# Patient Record
Sex: MALE | Race: WHITE | NOT HISPANIC OR LATINO | Employment: UNEMPLOYED | URBAN - METROPOLITAN AREA
[De-identification: names, ages, dates, MRNs, and addresses within clinical notes are randomized per-mention and may not be internally consistent; named-entity substitution may affect disease eponyms.]

---

## 2017-02-20 PROBLEM — F22 PARANOIA (PSYCHOSIS): Status: ACTIVE | Noted: 2017-02-20

## 2017-05-23 PROBLEM — F29 PSYCHOSIS: Status: ACTIVE | Noted: 2017-05-23

## 2017-05-31 ENCOUNTER — PATIENT OUTREACH (OUTPATIENT)
Dept: ADMINISTRATIVE | Facility: CLINIC | Age: 37
End: 2017-05-31

## 2017-05-31 NOTE — PATIENT INSTRUCTIONS
Psychosis  Psychosis is a symptom of certain mental health problems. It involves perceiving reality differently from those around you. The difference between reality and what you think become blurred in your mind. Other mental health conditions, physical diseases, traumatic experiences, or drugs and toxins may bring on psychotic symptoms or behavior.  There are different kinds of psychosis:  · Drug-induced (due to alcohol, methamphetamine, cocaine, LSD, PCP and others)  · Bipolar disorder  · Depression  · Schizophrenia  · Dementia  Symptoms  The symptoms of psychosis may not all be the same for each person. However, they usually involve:  · Hallucinations. Seeing, hearing, feeling, or even tasting or smelling things that are not there  · Delusions. Believing something that is not true, or false beliefs that are not part of a person's Judaism or cultural background.  There may also be disturbances in thinking, speech and behavior, which can include:  · Hearing voices that others do not hear  · Seeing things that others do not see  · Racing thoughts  · Lack of energy  · Feeling very fearful  · Disorganized speech  · Intentional or unintentional bodily harm to others  · Paranoia  · Trouble thinking or concentrating clearly  · Depression, feeling suicidal  · Insomnia  · Withdrawal from those around you  Treatment for psychosis depends on the cause. Medicine, with or without psychotherapy, is often used.  Home care  · Find a healthcare provider and therapist who meet your needs.  Seek help when you feel like your symptoms are returning  · Be certain to tell each of your healthcare providers about all of the prescription drugs, over-the-counter medicines, and supplements you take. Certain supplements interact with medicines and can result in dangerous side effects. Ask your pharmacist when you have questions about drug interactions.  · Be sure to take your medicine as directed even if you think you don't need  it.  · Follow-up with lab tests as advised by your healthcare provider.  · Talk with your family about your feelings and thoughts. Ask them to help you recognize any behavior changes so you can get help and, if needed, medicines can be adjusted.  Follow-up care  Follow up with your counselor, therapist or psychiatrist as advised.  Call 911  Call 911 if you:  · Have suicidal thoughts, a suicide plan, and the means to carry out the plan  · Have troubled breathing  · Are very confused  · Are very drowsy or have trouble awakening  · Faint or lose consciousness  · Have a rapid heart rate, very low heart rate, or a new irregular heart rate  · Have a seizure  When to seek medical advice  Call your healthcare provider right away if any of these occur:  · Gradual or rapid return of psychotic symptoms  · Feeling like you want to harm yourself or another  · Feeling extremely depressed  · Feeling very anxious, agitated, or angry  · Feeling out of control or being controlled by others  · Unable to care for yourself  · Seeing things or hearing voices that you know aren't real  Date Last Reviewed: 9/29/2015  © 8067-7968 [employer]. [address] [place] [address]. All rights reserved. This information is not intended as a substitute for professional medical care. Always follow your healthcare professional's instructions.

## 2017-05-31 NOTE — PROGRESS NOTES
TCC script completed by mother Thais Owen. She reports patient is working daily, no drugs, taking medications.

## 2017-07-27 PROBLEM — F15.10 AMPHETAMINE ABUSE: Status: ACTIVE | Noted: 2017-07-27

## 2017-07-31 PROBLEM — F15.10 AMPHETAMINE ABUSE: Status: ACTIVE | Noted: 2017-07-31

## 2018-11-01 PROBLEM — F23 BRIEF PSYCHOTIC DISORDER: Status: ACTIVE | Noted: 2018-11-01

## 2018-11-01 PROBLEM — R45.851 SUICIDAL IDEATION: Status: ACTIVE | Noted: 2018-11-01

## 2018-11-06 PROBLEM — F15.10 AMPHETAMINE ABUSE: Status: RESOLVED | Noted: 2017-07-31 | Resolved: 2018-11-06

## 2018-11-06 PROBLEM — R45.851 SUICIDAL IDEATION: Status: RESOLVED | Noted: 2018-11-01 | Resolved: 2018-11-06

## 2018-11-08 ENCOUNTER — NURSE TRIAGE (OUTPATIENT)
Dept: ADMINISTRATIVE | Facility: CLINIC | Age: 38
End: 2018-11-08

## 2018-11-08 ENCOUNTER — PATIENT OUTREACH (OUTPATIENT)
Dept: ADMINISTRATIVE | Facility: CLINIC | Age: 38
End: 2018-11-08

## 2018-11-08 NOTE — TELEPHONE ENCOUNTER
Reason for Disposition   [1] Prescription not at pharmacy AND [2] was prescribed today by PCP    Protocols used: ST MEDICATION QUESTION CALL-A-    Mother of Pt calling back due to Pt losing prescription for Depakote.  Pt's pharmacy is  [facility]  [address] [place] [address]; phoned in Rx @ [phone].  Mother called and notified.

## 2018-11-08 NOTE — PATIENT INSTRUCTIONS
Psychosis  Psychosis is a symptom of certain mental health problems. It involves perceiving reality differently from those around you. The difference between reality and what you think become blurred in your mind. Other mental health conditions, physical diseases, traumatic experiences, or drugs and toxins may bring on psychotic symptoms or behavior.  There are different kinds of psychosis:  · Drug-induced (due to alcohol, methamphetamine, cocaine, LSD, PCP and others)  · Bipolar disorder  · Depression  · Schizophrenia  · Dementia  Symptoms  The symptoms of psychosis may not all be the same for each person. However, they usually involve:  · Hallucinations. Seeing, hearing, feeling, or even tasting or smelling things that are not there  · Delusions. Believing something that is not true, or false beliefs that are not part of a person's Temple or cultural background.  There may also be disturbances in thinking, speech and behavior, which can include:  · Hearing voices that others do not hear  · Seeing things that others do not see  · Racing thoughts  · Lack of energy  · Feeling very fearful  · Disorganized speech  · Intentional or unintentional bodily harm to others  · Paranoia  · Trouble thinking or concentrating clearly  · Depression, feeling suicidal  · Insomnia  · Withdrawal from those around you  Treatment for psychosis depends on the cause. Medicine, with or without psychotherapy, is often used.  Home care  · Find a healthcare provider and therapist who meet your needs.  Seek help when you feel like your symptoms are returning  · Be certain to tell each of your healthcare providers about all of the prescription drugs, over-the-counter medicines, and supplements you take. Certain supplements interact with medicines and can result in dangerous side effects. Ask your pharmacist when you have questions about drug interactions.  · Be sure to take your medicine as directed even if you think you don't need  it.  · Follow-up with lab tests as advised by your healthcare provider.  · Talk with your family about your feelings and thoughts. Ask them to help you recognize any behavior changes so you can get help and, if needed, medicines can be adjusted.  Follow-up care  Follow up with your counselor, therapist or psychiatrist as advised.  Call 911  Call 911 if you:  · Have suicidal thoughts, a suicide plan, and the means to carry out the plan  · Have troubled breathing  · Are very confused  · Are very drowsy or have trouble awakening  · Faint or lose consciousness  · Have a rapid heart rate, very low heart rate, or a new irregular heart rate  · Have a seizure  When to seek medical advice  Call your healthcare provider right away if any of these occur:  · Gradual or rapid return of psychotic symptoms  · Feeling like you want to harm yourself or another  · Feeling extremely depressed  · Feeling very anxious, agitated, or angry  · Feeling out of control or being controlled by others  · Unable to care for yourself  · Seeing things or hearing voices that you know aren't real  Date Last Reviewed: 9/29/2015  © 2255-9914 [employer]. [address] [place] [address]. All rights reserved. This information is not intended as a substitute for professional medical care. Always follow your healthcare professional's instructions.

## 2018-11-08 NOTE — TELEPHONE ENCOUNTER
Reason for Disposition   Caller has medication question only, adult not sick, and triager answers question    Protocols used: ST MEDICATION QUESTION CALL-A-    Pt calling regarding refill for Depakote.  Noted in medication section of chart; Depakote refilled on 11/6/2018 with 11 refills and was printed.  Explained to Pt, Pt verbalized understanding.

## 2018-11-11 PROBLEM — F25.9 SCHIZOAFFECTIVE DISORDER: Status: ACTIVE | Noted: 2018-11-11

## 2018-11-12 PROBLEM — D64.9 ANEMIA: Status: ACTIVE | Noted: 2018-11-12

## 2018-11-12 PROBLEM — F17.200 TOBACCO USE DISORDER: Status: ACTIVE | Noted: 2018-11-12

## 2019-01-25 PROBLEM — R45.851 SUICIDAL IDEATION: Status: ACTIVE | Noted: 2019-01-25

## 2019-01-30 PROBLEM — R45.851 SUICIDAL INTENT: Status: ACTIVE | Noted: 2019-01-30

## 2019-02-05 PROBLEM — R45.851 SUICIDAL INTENT: Status: RESOLVED | Noted: 2019-01-30 | Resolved: 2019-02-05

## 2019-12-22 PROBLEM — F23 ACUTE PSYCHOSIS: Status: ACTIVE | Noted: 2017-05-23

## 2019-12-22 PROBLEM — F22 PARANOID DELUSION: Status: ACTIVE | Noted: 2019-12-22

## 2019-12-23 PROBLEM — F33.2 SEVERE RECURRENT MAJOR DEPRESSION WITHOUT PSYCHOTIC FEATURES: Status: ACTIVE | Noted: 2019-12-23

## 2020-01-01 ENCOUNTER — PATIENT OUTREACH (OUTPATIENT)
Dept: ADMINISTRATIVE | Facility: CLINIC | Age: 40
End: 2020-01-01

## 2021-08-03 ENCOUNTER — HOSPITAL ENCOUNTER (INPATIENT)
Facility: HOSPITAL | Age: 41
LOS: 6 days | Discharge: HOME | DRG: 897 | End: 2021-08-09
Attending: PSYCHIATRY & NEUROLOGY | Admitting: PSYCHIATRY & NEUROLOGY
Payer: MEDICAID

## 2021-08-03 DIAGNOSIS — F29 PSYCHOSIS, UNSPECIFIED PSYCHOSIS TYPE: ICD-10-CM

## 2021-08-03 DIAGNOSIS — F23 ACUTE PSYCHOSIS: Primary | ICD-10-CM

## 2021-08-03 PROCEDURE — 11400000 HC PSYCH PRIVATE ROOM

## 2021-08-03 RX ORDER — HALOPERIDOL 5 MG/ML
5 INJECTION INTRAMUSCULAR EVERY 4 HOURS PRN
Status: DISCONTINUED | OUTPATIENT
Start: 2021-08-03 | End: 2021-08-09 | Stop reason: HOSPADM

## 2021-08-03 RX ORDER — LORAZEPAM 2 MG/ML
2 INJECTION INTRAMUSCULAR EVERY 4 HOURS PRN
Status: DISCONTINUED | OUTPATIENT
Start: 2021-08-03 | End: 2021-08-09 | Stop reason: HOSPADM

## 2021-08-03 RX ORDER — MUPIROCIN 20 MG/G
OINTMENT TOPICAL 2 TIMES DAILY
Status: DISCONTINUED | OUTPATIENT
Start: 2021-08-03 | End: 2021-08-07

## 2021-08-03 RX ORDER — MAG HYDROX/ALUMINUM HYD/SIMETH 200-200-20
30 SUSPENSION, ORAL (FINAL DOSE FORM) ORAL EVERY 6 HOURS PRN
Status: DISCONTINUED | OUTPATIENT
Start: 2021-08-03 | End: 2021-08-09 | Stop reason: HOSPADM

## 2021-08-03 RX ORDER — HYDROXYZINE PAMOATE 50 MG/1
50 CAPSULE ORAL NIGHTLY PRN
Status: DISCONTINUED | OUTPATIENT
Start: 2021-08-03 | End: 2021-08-09 | Stop reason: HOSPADM

## 2021-08-03 RX ORDER — DIPHENHYDRAMINE HYDROCHLORIDE 50 MG/ML
50 INJECTION INTRAMUSCULAR; INTRAVENOUS EVERY 4 HOURS PRN
Status: DISCONTINUED | OUTPATIENT
Start: 2021-08-03 | End: 2021-08-09 | Stop reason: HOSPADM

## 2021-08-03 RX ORDER — ACETAMINOPHEN 325 MG/1
650 TABLET ORAL EVERY 6 HOURS PRN
Status: DISCONTINUED | OUTPATIENT
Start: 2021-08-03 | End: 2021-08-09 | Stop reason: HOSPADM

## 2021-08-03 RX ORDER — HALOPERIDOL 5 MG/1
5 TABLET ORAL EVERY 4 HOURS PRN
Status: DISCONTINUED | OUTPATIENT
Start: 2021-08-03 | End: 2021-08-09 | Stop reason: HOSPADM

## 2021-08-03 RX ORDER — IBUPROFEN 400 MG/1
400 TABLET ORAL EVERY 6 HOURS PRN
Status: DISCONTINUED | OUTPATIENT
Start: 2021-08-03 | End: 2021-08-09 | Stop reason: HOSPADM

## 2021-08-03 RX ORDER — ADHESIVE BANDAGE
30 BANDAGE TOPICAL DAILY PRN
Status: DISCONTINUED | OUTPATIENT
Start: 2021-08-03 | End: 2021-08-09 | Stop reason: HOSPADM

## 2021-08-03 RX ORDER — IBUPROFEN 200 MG
1 TABLET ORAL DAILY
Status: DISCONTINUED | OUTPATIENT
Start: 2021-08-04 | End: 2021-08-09 | Stop reason: HOSPADM

## 2021-08-03 RX ORDER — LORAZEPAM 1 MG/1
2 TABLET ORAL EVERY 4 HOURS PRN
Status: DISCONTINUED | OUTPATIENT
Start: 2021-08-03 | End: 2021-08-09 | Stop reason: HOSPADM

## 2021-08-03 RX ORDER — DIPHENHYDRAMINE HCL 50 MG
50 CAPSULE ORAL EVERY 4 HOURS PRN
Status: DISCONTINUED | OUTPATIENT
Start: 2021-08-03 | End: 2021-08-09 | Stop reason: HOSPADM

## 2021-08-03 RX ORDER — FOLIC ACID 1 MG/1
1 TABLET ORAL DAILY
Status: DISCONTINUED | OUTPATIENT
Start: 2021-08-04 | End: 2021-08-04

## 2021-08-04 LAB
CHOLEST SERPL-MCNC: 211 MG/DL (ref 120–199)
CHOLEST/HDLC SERPL: 5 {RATIO} (ref 2–5)
ESTIMATED AVG GLUCOSE: 105 MG/DL (ref 68–131)
HBA1C MFR BLD: 5.3 % (ref 4–5.6)
HDLC SERPL-MCNC: 42 MG/DL (ref 40–75)
HDLC SERPL: 19.9 % (ref 20–50)
LDLC SERPL CALC-MCNC: 152 MG/DL (ref 63–159)
NONHDLC SERPL-MCNC: 169 MG/DL
TRIGL SERPL-MCNC: 85 MG/DL (ref 30–150)

## 2021-08-04 PROCEDURE — 36415 COLL VENOUS BLD VENIPUNCTURE: CPT | Performed by: PSYCHIATRY & NEUROLOGY

## 2021-08-04 PROCEDURE — 25000003 PHARM REV CODE 250: Performed by: PSYCHIATRY & NEUROLOGY

## 2021-08-04 PROCEDURE — 25000003 PHARM REV CODE 250: Performed by: INTERNAL MEDICINE

## 2021-08-04 PROCEDURE — 11400000 HC PSYCH PRIVATE ROOM

## 2021-08-04 PROCEDURE — 83036 HEMOGLOBIN GLYCOSYLATED A1C: CPT | Performed by: PSYCHIATRY & NEUROLOGY

## 2021-08-04 PROCEDURE — 80061 LIPID PANEL: CPT | Performed by: PSYCHIATRY & NEUROLOGY

## 2021-08-04 RX ORDER — MIRTAZAPINE 7.5 MG/1
7.5 TABLET, FILM COATED ORAL NIGHTLY
Status: DISCONTINUED | OUTPATIENT
Start: 2021-08-04 | End: 2021-08-09 | Stop reason: HOSPADM

## 2021-08-04 RX ADMIN — THERA TABS 1 TABLET: TAB at 08:08

## 2021-08-04 RX ADMIN — FOLIC ACID 1 MG: 1 TABLET ORAL at 08:08

## 2021-08-04 RX ADMIN — HYDROXYZINE PAMOATE 50 MG: 50 CAPSULE ORAL at 08:08

## 2021-08-04 RX ADMIN — MIRTAZAPINE 7.5 MG: 7.5 TABLET ORAL at 08:08

## 2021-08-05 PROCEDURE — 25000003 PHARM REV CODE 250: Performed by: PSYCHIATRY & NEUROLOGY

## 2021-08-05 PROCEDURE — 25000003 PHARM REV CODE 250: Performed by: INTERNAL MEDICINE

## 2021-08-05 PROCEDURE — 11400000 HC PSYCH PRIVATE ROOM

## 2021-08-05 RX ORDER — HYDROXYZINE PAMOATE 50 MG/1
50 CAPSULE ORAL EVERY 8 HOURS PRN
Status: DISCONTINUED | OUTPATIENT
Start: 2021-08-05 | End: 2021-08-09 | Stop reason: HOSPADM

## 2021-08-05 RX ADMIN — HYDROXYZINE PAMOATE 50 MG: 50 CAPSULE ORAL at 04:08

## 2021-08-05 RX ADMIN — MIRTAZAPINE 7.5 MG: 7.5 TABLET ORAL at 08:08

## 2021-08-05 RX ADMIN — HYDROXYZINE PAMOATE 50 MG: 50 CAPSULE ORAL at 08:08

## 2021-08-06 LAB — SARS-COV-2 RNA RESP QL NAA+PROBE: NOT DETECTED

## 2021-08-06 PROCEDURE — 25000003 PHARM REV CODE 250: Performed by: INTERNAL MEDICINE

## 2021-08-06 PROCEDURE — 25000003 PHARM REV CODE 250: Performed by: PSYCHIATRY & NEUROLOGY

## 2021-08-06 PROCEDURE — U0002 COVID-19 LAB TEST NON-CDC: HCPCS | Performed by: PSYCHIATRY & NEUROLOGY

## 2021-08-06 PROCEDURE — 11400000 HC PSYCH PRIVATE ROOM

## 2021-08-06 RX ADMIN — HYDROXYZINE PAMOATE 50 MG: 50 CAPSULE ORAL at 03:08

## 2021-08-06 RX ADMIN — MUPIROCIN: 20 OINTMENT TOPICAL at 08:08

## 2021-08-06 RX ADMIN — MIRTAZAPINE 7.5 MG: 7.5 TABLET ORAL at 08:08

## 2021-08-07 PROCEDURE — 11400000 HC PSYCH PRIVATE ROOM

## 2021-08-07 PROCEDURE — 25000003 PHARM REV CODE 250: Performed by: INTERNAL MEDICINE

## 2021-08-07 PROCEDURE — 99233 SBSQ HOSP IP/OBS HIGH 50: CPT | Mod: ,,, | Performed by: PSYCHIATRY & NEUROLOGY

## 2021-08-07 PROCEDURE — 25000003 PHARM REV CODE 250: Performed by: PSYCHIATRY & NEUROLOGY

## 2021-08-07 PROCEDURE — S4991 NICOTINE PATCH NONLEGEND: HCPCS | Performed by: INTERNAL MEDICINE

## 2021-08-07 PROCEDURE — 99233 PR SUBSEQUENT HOSPITAL CARE,LEVL III: ICD-10-PCS | Mod: ,,, | Performed by: PSYCHIATRY & NEUROLOGY

## 2021-08-07 RX ADMIN — NICOTINE 1 PATCH: 21 PATCH, EXTENDED RELEASE TRANSDERMAL at 08:08

## 2021-08-07 RX ADMIN — MIRTAZAPINE 7.5 MG: 7.5 TABLET ORAL at 08:08

## 2021-08-07 RX ADMIN — HYDROXYZINE PAMOATE 50 MG: 50 CAPSULE ORAL at 08:08

## 2021-08-07 RX ADMIN — MUPIROCIN: 20 OINTMENT TOPICAL at 08:08

## 2021-08-08 PROCEDURE — 11400000 HC PSYCH PRIVATE ROOM

## 2021-08-08 PROCEDURE — 25000003 PHARM REV CODE 250: Performed by: INTERNAL MEDICINE

## 2021-08-08 PROCEDURE — 99232 PR SUBSEQUENT HOSPITAL CARE,LEVL II: ICD-10-PCS | Mod: ,,, | Performed by: PSYCHIATRY & NEUROLOGY

## 2021-08-08 PROCEDURE — 99232 SBSQ HOSP IP/OBS MODERATE 35: CPT | Mod: ,,, | Performed by: PSYCHIATRY & NEUROLOGY

## 2021-08-08 PROCEDURE — 25000003 PHARM REV CODE 250: Performed by: PSYCHIATRY & NEUROLOGY

## 2021-08-08 RX ADMIN — MIRTAZAPINE 7.5 MG: 7.5 TABLET ORAL at 08:08

## 2021-08-08 RX ADMIN — HYDROXYZINE PAMOATE 50 MG: 50 CAPSULE ORAL at 08:08

## 2021-08-09 VITALS
BODY MASS INDEX: 27.4 KG/M2 | RESPIRATION RATE: 20 BRPM | WEIGHT: 185 LBS | SYSTOLIC BLOOD PRESSURE: 124 MMHG | HEIGHT: 69 IN | TEMPERATURE: 98 F | DIASTOLIC BLOOD PRESSURE: 72 MMHG | OXYGEN SATURATION: 100 % | HEART RATE: 80 BPM

## 2021-08-09 PROBLEM — F29 PSYCHOSIS: Status: RESOLVED | Noted: 2021-08-03 | Resolved: 2021-08-09

## 2021-08-09 LAB — SARS-COV-2 RNA RESP QL NAA+PROBE: NOT DETECTED

## 2021-08-09 PROCEDURE — 25000003 PHARM REV CODE 250: Performed by: PSYCHIATRY & NEUROLOGY

## 2021-08-09 PROCEDURE — 25000003 PHARM REV CODE 250: Performed by: INTERNAL MEDICINE

## 2021-08-09 PROCEDURE — U0002 COVID-19 LAB TEST NON-CDC: HCPCS | Performed by: PSYCHIATRY & NEUROLOGY

## 2021-08-09 RX ORDER — SULFAMETHOXAZOLE AND TRIMETHOPRIM 800; 160 MG/1; MG/1
1 TABLET ORAL 2 TIMES DAILY
Qty: 13 TABLET | Refills: 0 | Status: ON HOLD | OUTPATIENT
Start: 2021-08-09 | End: 2023-02-22 | Stop reason: HOSPADM

## 2021-08-09 RX ORDER — MIRTAZAPINE 7.5 MG/1
7.5 TABLET, FILM COATED ORAL NIGHTLY
Qty: 30 TABLET | Refills: 0 | Status: ON HOLD | OUTPATIENT
Start: 2021-08-09 | End: 2023-02-22 | Stop reason: HOSPADM

## 2021-08-09 RX ORDER — SULFAMETHOXAZOLE AND TRIMETHOPRIM 800; 160 MG/1; MG/1
1 TABLET ORAL 2 TIMES DAILY
Status: DISCONTINUED | OUTPATIENT
Start: 2021-08-09 | End: 2021-08-09 | Stop reason: HOSPADM

## 2021-08-09 RX ORDER — IBUPROFEN 200 MG
1 TABLET ORAL DAILY
Qty: 30 PATCH | Refills: 0 | Status: ON HOLD | OUTPATIENT
Start: 2021-08-10 | End: 2023-02-22 | Stop reason: HOSPADM

## 2021-08-09 RX ADMIN — SULFAMETHOXAZOLE AND TRIMETHOPRIM 1 TABLET: 800; 160 TABLET ORAL at 10:08

## 2021-08-09 RX ADMIN — HYDROXYZINE PAMOATE 50 MG: 50 CAPSULE ORAL at 10:08

## 2022-07-28 ENCOUNTER — HOSPITAL ENCOUNTER (EMERGENCY)
Facility: HOSPITAL | Age: 42
Discharge: HOME | End: 2022-07-28
Attending: STUDENT IN AN ORGANIZED HEALTH CARE EDUCATION/TRAINING PROGRAM
Payer: MEDICAID

## 2022-07-28 VITALS
HEIGHT: 69 IN | SYSTOLIC BLOOD PRESSURE: 189 MMHG | TEMPERATURE: 98 F | HEART RATE: 91 BPM | OXYGEN SATURATION: 99 % | RESPIRATION RATE: 16 BRPM | DIASTOLIC BLOOD PRESSURE: 91 MMHG | BODY MASS INDEX: 23.7 KG/M2 | WEIGHT: 160 LBS

## 2022-07-28 DIAGNOSIS — F22 PARANOID: ICD-10-CM

## 2022-07-28 DIAGNOSIS — F15.10 METHAMPHETAMINE ABUSE: Primary | ICD-10-CM

## 2022-07-28 LAB
ALBUMIN SERPL BCP-MCNC: 4.1 G/DL (ref 3.5–5.2)
ALP SERPL-CCNC: 81 U/L (ref 55–135)
ALT SERPL W/O P-5'-P-CCNC: 27 U/L (ref 10–44)
AMPHET+METHAMPHET UR QL: ABNORMAL
ANION GAP SERPL CALC-SCNC: 6 MMOL/L (ref 8–16)
APAP SERPL-MCNC: <2 UG/ML (ref 10–20)
AST SERPL-CCNC: 20 U/L (ref 10–40)
BARBITURATES UR QL SCN>200 NG/ML: NEGATIVE
BASOPHILS # BLD AUTO: 0.09 K/UL (ref 0–0.2)
BASOPHILS NFR BLD: 1 % (ref 0–1.9)
BENZODIAZ UR QL SCN>200 NG/ML: NEGATIVE
BILIRUB SERPL-MCNC: 0.4 MG/DL (ref 0.1–1)
BILIRUB UR QL STRIP: NEGATIVE
BUN SERPL-MCNC: 9 MG/DL (ref 6–20)
BZE UR QL SCN: NEGATIVE
CALCIUM SERPL-MCNC: 8.7 MG/DL (ref 8.7–10.5)
CANNABINOIDS UR QL SCN: ABNORMAL
CHLORIDE SERPL-SCNC: 106 MMOL/L (ref 95–110)
CLARITY UR: CLEAR
CO2 SERPL-SCNC: 26 MMOL/L (ref 23–29)
COLOR UR: YELLOW
CREAT SERPL-MCNC: 0.9 MG/DL (ref 0.5–1.4)
CREAT UR-MCNC: 15.4 MG/DL (ref 23–375)
CTP QC/QA: YES
DIFFERENTIAL METHOD: ABNORMAL
EOSINOPHIL # BLD AUTO: 0.2 K/UL (ref 0–0.5)
EOSINOPHIL NFR BLD: 1.9 % (ref 0–8)
ERYTHROCYTE [DISTWIDTH] IN BLOOD BY AUTOMATED COUNT: 13.4 % (ref 11.5–14.5)
EST. GFR  (AFRICAN AMERICAN): >60 ML/MIN/1.73 M^2
EST. GFR  (NON AFRICAN AMERICAN): >60 ML/MIN/1.73 M^2
ETHANOL SERPL-MCNC: <3 MG/DL
GLUCOSE SERPL-MCNC: 123 MG/DL (ref 70–110)
GLUCOSE UR QL STRIP: NEGATIVE
HCT VFR BLD AUTO: 35.7 % (ref 40–54)
HGB BLD-MCNC: 12.4 G/DL (ref 14–18)
HGB UR QL STRIP: NEGATIVE
IMM GRANULOCYTES # BLD AUTO: 0.04 K/UL (ref 0–0.04)
IMM GRANULOCYTES NFR BLD AUTO: 0.5 % (ref 0–0.5)
KETONES UR QL STRIP: NEGATIVE
LEUKOCYTE ESTERASE UR QL STRIP: NEGATIVE
LYMPHOCYTES # BLD AUTO: 1.9 K/UL (ref 1–4.8)
LYMPHOCYTES NFR BLD: 21.5 % (ref 18–48)
MCH RBC QN AUTO: 31.8 PG (ref 27–31)
MCHC RBC AUTO-ENTMCNC: 34.7 G/DL (ref 32–36)
MCV RBC AUTO: 92 FL (ref 82–98)
METHADONE UR QL SCN>300 NG/ML: NEGATIVE
MONOCYTES # BLD AUTO: 0.6 K/UL (ref 0.3–1)
MONOCYTES NFR BLD: 6.4 % (ref 4–15)
NEUTROPHILS # BLD AUTO: 6 K/UL (ref 1.8–7.7)
NEUTROPHILS NFR BLD: 68.7 % (ref 38–73)
NITRITE UR QL STRIP: NEGATIVE
NRBC BLD-RTO: 0 /100 WBC
OPIATES UR QL SCN: NEGATIVE
PCP UR QL SCN>25 NG/ML: NEGATIVE
PH UR STRIP: 7 [PH] (ref 5–8)
PLATELET # BLD AUTO: 274 K/UL (ref 150–450)
PMV BLD AUTO: 9.8 FL (ref 9.2–12.9)
POTASSIUM SERPL-SCNC: 3.5 MMOL/L (ref 3.5–5.1)
PROT SERPL-MCNC: 7.4 G/DL (ref 6–8.4)
PROT UR QL STRIP: NEGATIVE
RBC # BLD AUTO: 3.9 M/UL (ref 4.6–6.2)
SARS-COV-2 RDRP RESP QL NAA+PROBE: NEGATIVE
SODIUM SERPL-SCNC: 138 MMOL/L (ref 136–145)
SP GR UR STRIP: <=1.005 (ref 1–1.03)
TOXICOLOGY INFORMATION: ABNORMAL
URN SPEC COLLECT METH UR: ABNORMAL
UROBILINOGEN UR STRIP-ACNC: NEGATIVE EU/DL
WBC # BLD AUTO: 8.75 K/UL (ref 3.9–12.7)

## 2022-07-28 PROCEDURE — U0002 COVID-19 LAB TEST NON-CDC: HCPCS | Performed by: STUDENT IN AN ORGANIZED HEALTH CARE EDUCATION/TRAINING PROGRAM

## 2022-07-28 PROCEDURE — 80053 COMPREHEN METABOLIC PANEL: CPT | Performed by: STUDENT IN AN ORGANIZED HEALTH CARE EDUCATION/TRAINING PROGRAM

## 2022-07-28 PROCEDURE — 82077 ASSAY SPEC XCP UR&BREATH IA: CPT | Performed by: STUDENT IN AN ORGANIZED HEALTH CARE EDUCATION/TRAINING PROGRAM

## 2022-07-28 PROCEDURE — 99283 EMERGENCY DEPT VISIT LOW MDM: CPT

## 2022-07-28 PROCEDURE — 81003 URINALYSIS AUTO W/O SCOPE: CPT | Mod: 59 | Performed by: STUDENT IN AN ORGANIZED HEALTH CARE EDUCATION/TRAINING PROGRAM

## 2022-07-28 PROCEDURE — 36415 COLL VENOUS BLD VENIPUNCTURE: CPT | Performed by: STUDENT IN AN ORGANIZED HEALTH CARE EDUCATION/TRAINING PROGRAM

## 2022-07-28 PROCEDURE — 80143 DRUG ASSAY ACETAMINOPHEN: CPT | Performed by: STUDENT IN AN ORGANIZED HEALTH CARE EDUCATION/TRAINING PROGRAM

## 2022-07-28 PROCEDURE — 80307 DRUG TEST PRSMV CHEM ANLYZR: CPT | Performed by: STUDENT IN AN ORGANIZED HEALTH CARE EDUCATION/TRAINING PROGRAM

## 2022-07-28 PROCEDURE — 85025 COMPLETE CBC W/AUTO DIFF WBC: CPT | Performed by: STUDENT IN AN ORGANIZED HEALTH CARE EDUCATION/TRAINING PROGRAM

## 2022-07-28 PROCEDURE — 25000003 PHARM REV CODE 250: Performed by: STUDENT IN AN ORGANIZED HEALTH CARE EDUCATION/TRAINING PROGRAM

## 2022-07-28 RX ORDER — OLANZAPINE 10 MG/2ML
10 INJECTION, POWDER, FOR SOLUTION INTRAMUSCULAR ONCE AS NEEDED
Status: DISCONTINUED | OUTPATIENT
Start: 2022-07-28 | End: 2022-07-28

## 2022-07-28 RX ORDER — OLANZAPINE 5 MG/1
10 TABLET ORAL ONCE
Status: COMPLETED | OUTPATIENT
Start: 2022-07-28 | End: 2022-07-28

## 2022-07-28 RX ORDER — OLANZAPINE 5 MG/1
10 TABLET ORAL DAILY
Status: DISCONTINUED | OUTPATIENT
Start: 2022-07-28 | End: 2022-07-28

## 2022-07-28 RX ADMIN — OLANZAPINE 10 MG: 5 TABLET, FILM COATED ORAL at 02:07

## 2022-07-28 NOTE — ED PROVIDER NOTES
Encounter Date: 7/28/2022       History     Chief Complaint   Patient presents with    Mental Health Problem     Pt reports he is paranoid and feels like someone is trying to hurt him.  States he is having auditory and visual hallucinations. Admits to snorting meth 1-2 days ago.  Has not taken any of his mental health medications for approx a month.      41-year-old male with history of bipolar and methamphetamine abuse presents with paranoia about someone being after him.  Patient said that she started having auditory and visual hallucination after snorting methamphetamine 2 days ago.  Patient has not slept due to paranoia.  Denies any suicidal ideation, homicidal ideation, command auditory hallucination, other coingestion        Review of patient's allergies indicates:  No Known Allergies  Past Medical History:   Diagnosis Date    Addiction to drug     ADHD (attention deficit hyperactivity disorder)     [facility]    Anxiety     Bipolar disorder     Depression     2-3 years ago    Fatigue     History of psychiatric hospitalization     Hx of psychiatric care     Maria Luz     approximately 5 years ago    Psychiatric exam requested by authority     Psychiatric problem     Psychosis     Schizoaffective disorder     Sleep difficulties     Substance abuse     Therapy     Withdrawal symptoms, drug or narcotic      History reviewed. No pertinent surgical history.  Family History   Problem Relation Age of Onset    No Known Problems Mother     Drug abuse Father     No Known Problems Maternal Aunt     No Known Problems Paternal Aunt     No Known Problems Paternal Uncle      Social History     Tobacco Use    Smoking status: Former Smoker     Packs/day: 1.00    Smokeless tobacco: Never Used    Tobacco comment: Reports that he doesn't smoke much.    Substance Use Topics    Alcohol use: No     Comment: just on special occasions, approx 1 time per month    Drug use: Yes      Types: Methamphetamines, Marijuana, Cocaine     Comment: CRYSTAL METH      Review of Systems   Constitutional: Negative.    HENT: Negative.    Respiratory: Negative.    Cardiovascular: Negative.    Gastrointestinal: Negative.    Genitourinary: Negative.    Musculoskeletal: Negative.    Skin: Negative.    Neurological: Negative.    Psychiatric/Behavioral: Positive for agitation, behavioral problems and hallucinations. The patient is nervous/anxious.    All other systems reviewed and are negative.      Physical Exam     Initial Vitals [07/28/22 0203]   BP Pulse Resp Temp SpO2   (!) 164/98 89 18 97.7 °F (36.5 °C) 98 %      MAP       --         Physical Exam    Nursing note and vitals reviewed.  Constitutional: Vital signs are normal. He appears well-developed and well-nourished.   HENT:   Head: Normocephalic and atraumatic.   Eyes: Conjunctivae and lids are normal.   Neck: Trachea normal. Neck supple.   Cardiovascular: Normal rate, regular rhythm, normal heart sounds and normal pulses.   Pulmonary/Chest: Breath sounds normal. He has no wheezes. He has no rhonchi.   Abdominal: Abdomen is soft. Bowel sounds are normal. He exhibits no distension. There is no abdominal tenderness. There is no rebound and no guarding.   Musculoskeletal:         General: Normal range of motion.      Cervical back: Neck supple.     Neurological: He is alert and oriented to person, place, and time. He has normal strength. GCS eye subscore is 4. GCS verbal subscore is 5. GCS motor subscore is 6.   Skin: Skin is warm. Capillary refill takes less than 2 seconds.   Psychiatric: His speech is normal.   Anxious and restless.  Appears afraid         ED Course   Procedures  Labs Reviewed   CBC W/ AUTO DIFFERENTIAL - Abnormal; Notable for the following components:       Result Value    RBC 3.90 (*)     Hemoglobin 12.4 (*)     Hematocrit 35.7 (*)     MCH 31.8 (*)     All other components within normal limits   COMPREHENSIVE METABOLIC PANEL - Abnormal;  Notable for the following components:    Glucose 123 (*)     Anion Gap 6 (*)     All other components within normal limits   URINALYSIS, REFLEX TO URINE CULTURE - Abnormal; Notable for the following components:    Specific Gravity, UA <=1.005 (*)     All other components within normal limits    Narrative:     Preferred Collection Type->Urine, Clean Catch  Specimen Source->Urine   DRUG SCREEN PANEL, URINE EMERGENCY - Abnormal; Notable for the following components:    Amphetamine Screen, Ur Presumptive Positive (*)     THC Presumptive Positive (*)     Creatinine, Urine 15.4 (*)     All other components within normal limits    Narrative:     Preferred Collection Type->Urine, Clean Catch  Specimen Source->Urine   ACETAMINOPHEN LEVEL - Abnormal; Notable for the following components:    Acetaminophen (Tylenol), Serum <2.0 (*)     All other components within normal limits   ALCOHOL,MEDICAL (ETHANOL)   SARS-COV-2 RDRP GENE          Imaging Results    None          Medications   OLANZapine tablet 10 mg (10 mg Oral Given 7/28/22 0229)     Medical Decision Making:   Initial Assessment:   41-year-old male with history of bipolar and methamphetamine abuse presents with paranoia about someone being after him.  Afebrile vitals stable.  Physical noted.  Will get basic psych labs.  Will give a dose of Zyprexa and re-evaluate in the morning was patient get some sleep  Clinical Tests:   Lab Tests: Ordered and Reviewed  The following lab test(s) were unremarkable: CBC, CMP and Urinalysis             ED Course as of 07/28/22 0528   Thu Jul 28, 2022   0527 Patient re-evaluated by me.  Patient says that he feels much more calm and is almost ready to go home.  Will let patient sleep till the morning.  Will provide breakfast.  Plan to discharge patient after patient's breakfast.  Advised patient to avoid drugs.  Return precautions given.  Advised patient he should take his psych medication [HD]      ED Course User Index  [HD] Alberto Agrawal,  MD             Clinical Impression:   Final diagnoses:  [F15.10] Methamphetamine abuse (Primary)                 Alberto Agrawal MD  07/28/22 0528

## 2022-07-28 NOTE — ED NOTES
Pt resting quietly in bed.  Pt voiced that he is scare of the dark and scared to be alone.  Security sitting at bedside for pt comfort.

## 2022-07-28 NOTE — ED NOTES
Pt voices good relief of symptoms with medication.  Pt states that he wants to rest a little longer then he will go home after breakfast.

## 2023-02-15 PROBLEM — F15.959 METHAMPHETAMINE-INDUCED PSYCHOTIC DISORDER: Status: ACTIVE | Noted: 2023-02-15

## 2023-02-23 ENCOUNTER — PATIENT OUTREACH (OUTPATIENT)
Dept: ADMINISTRATIVE | Facility: CLINIC | Age: 43
End: 2023-02-23
Payer: MEDICAID

## 2023-02-23 NOTE — PROGRESS NOTES
C3 nurse spoke with Kavin De Paz for a TCC post hospital discharge follow up call. The patient reports does not have a scheduled HOSFU appointment. C3 nurse was unable to schedule [facility]OSFU appointment [facility]. Patient advised to contact their PCP to schedule a HOSPFU within 5-7 days.

## 2024-05-21 DIAGNOSIS — Z12.11 SCREEN FOR COLON CANCER: ICD-10-CM

## 2024-05-21 DIAGNOSIS — Z12.11 COLON CANCER SCREENING: Primary | ICD-10-CM

## 2024-05-21 RX ORDER — LIDOCAINE HYDROCHLORIDE 10 MG/ML
1 INJECTION, SOLUTION EPIDURAL; INFILTRATION; INTRACAUDAL; PERINEURAL ONCE
Status: DISCONTINUED | OUTPATIENT
Start: 2024-05-21 | End: 2024-05-21 | Stop reason: HOSPADM

## 2024-05-21 RX ORDER — SODIUM CHLORIDE 9 MG/ML
INJECTION, SOLUTION INTRAVENOUS CONTINUOUS
OUTPATIENT
Start: 2024-05-21

## 2024-06-05 ENCOUNTER — HOSPITAL ENCOUNTER (OUTPATIENT)
Facility: HOSPITAL | Age: 44
Discharge: HOME | End: 2024-06-05
Attending: SURGERY | Admitting: SURGERY
Payer: MEDICAID

## 2024-06-05 VITALS
SYSTOLIC BLOOD PRESSURE: 121 MMHG | RESPIRATION RATE: 18 BRPM | DIASTOLIC BLOOD PRESSURE: 76 MMHG | HEART RATE: 52 BPM | OXYGEN SATURATION: 98 % | TEMPERATURE: 97 F

## 2024-06-05 LAB
AMPHET+METHAMPHET UR QL: ABNORMAL
BARBITURATES UR QL SCN>200 NG/ML: NEGATIVE
BENZODIAZ UR QL SCN>200 NG/ML: NEGATIVE
BZE UR QL SCN: NEGATIVE
CANNABINOIDS UR QL SCN: ABNORMAL
CREAT UR-MCNC: 301.7 MG/DL (ref 23–375)
METHADONE UR QL SCN>300 NG/ML: NEGATIVE
OPIATES UR QL SCN: NEGATIVE
PCP UR QL SCN>25 NG/ML: NEGATIVE
TOXICOLOGY INFORMATION: ABNORMAL

## 2024-06-05 PROCEDURE — 80307 DRUG TEST PRSMV CHEM ANLYZR: CPT | Performed by: SURGERY
